# Patient Record
(demographics unavailable — no encounter records)

---

## 2024-12-08 NOTE — HISTORY OF PRESENT ILLNESS
[Neck] : neck [2] : 2 [1] : 2 [Dull/Aching] : dull/aching [Intermittent] : intermittent [Leisure] : leisure [Rest] : rest [Full time] : Work status: full time [de-identified] : 12/06/2024 - Patient presenting in follow up. She reports return of pain/tingling throughout her upper extremity. Stength is reported as intact, but states difficulty opening objects due to pain. D/c Lyrica due to side effects. She is doing pure barre classes which is inclusive of stretching exercises.   09/30/2024 - Patient reports resolution of symptoms. Not symptomatic enough to require medication for pain. She completes home exercises which she finds helpful, specifically mentioning barre classes.   08/09/2024 - Patient presenting to review cervical mri. C/o right proximal arm pain. Takes Meloxicam which is overall helpful. Pain severity worse at nighttime. Attends barre classes for exercise. Severity is controlled compared to prior visit.   07/26/2024 - 50-year-old female presenting for an initial evaluation with complaints of right proximal arm and shoulder pain that started 2 weeks ago without any trauma or injury. The pain level has progressively increased and is now significant. Massages have not been helping. She experiences an intermittent cold sensation in her arm and describes a constant burning sensation. There is no arm weakness. She felt better with an oral steroid, but the pain returned after stopping the medication. 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

## 2024-12-08 NOTE — PHYSICAL EXAM
[Normal Coordination] : normal coordination [Normal DTR UE/LE] : normal DTR UE/LE  [Normal Sensation] : normal sensation [Normal Mood and Affect] : normal mood and affect [Oriented] : oriented [Able to Communicate] : able to communicate [Normal Skin] : normal skin [No Rash] : no rash [No Ulcers] : no ulcers [No Lesions] : no lesions [No obvious lymphadenopathy in areas examined] : no obvious lymphadenopathy in areas examined [Well Developed] : well developed [Peripheral vascular exam is grossly normal] : peripheral vascular exam is grossly normal [No Respiratory Distress] : no respiratory distress [NL (45)] : right lateral flexion 45 degrees [NL (80)] : right lateral rotation 80 degrees [5___] : right grasp 5[unfilled]/5 [Biceps 2+] : biceps 2+ [Triceps 2+] : triceps 2+ [Brachioradialis 2+] : brachioradialis 2+ [] : no rhomboid tenderness

## 2024-12-08 NOTE — HISTORY OF PRESENT ILLNESS
[Neck] : neck [2] : 2 [1] : 2 [Dull/Aching] : dull/aching [Intermittent] : intermittent [Leisure] : leisure [Rest] : rest [Full time] : Work status: full time [de-identified] : 12/06/2024 - Patient presenting in follow up. She reports return of pain/tingling throughout her upper extremity. Stength is reported as intact, but states difficulty opening objects due to pain. D/c Lyrica due to side effects. She is doing pure barre classes which is inclusive of stretching exercises.   09/30/2024 - Patient reports resolution of symptoms. Not symptomatic enough to require medication for pain. She completes home exercises which she finds helpful, specifically mentioning barre classes.   08/09/2024 - Patient presenting to review cervical mri. C/o right proximal arm pain. Takes Meloxicam which is overall helpful. Pain severity worse at nighttime. Attends barre classes for exercise. Severity is controlled compared to prior visit.   07/26/2024 - 50-year-old female presenting for an initial evaluation with complaints of right proximal arm and shoulder pain that started 2 weeks ago without any trauma or injury. The pain level has progressively increased and is now significant. Massages have not been helping. She experiences an intermittent cold sensation in her arm and describes a constant burning sensation. There is no arm weakness. She felt better with an oral steroid, but the pain returned after stopping the medication. 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

## 2024-12-08 NOTE — DISCUSSION/SUMMARY
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 Due to return of radicular symptoms, referred to pain management do discuss trying GRETTA.  Will remain conservative at this time. More urgency for surgery (ACDF C5/6) if there is onset of motor or neurological deficits.  F/U after injection.   Prior to appointment and during encounter with patient extensive medical records were reviewed including but not limited to, hospital records, outpatient records, imaging results, and lab data. During this appointment the patient was examined, diagnoses were discussed and explained in a face to face manner. In addition extensive time was spent reviewing aforementioned diagnostic studies. Counseling including abnormal image results, differential diagnoses, treatment options, risk and benefits, lifestyle changes, current condition, and current medications was performed. Patient's comments, questions, and concerns were addressed and patient verbalized understanding. Based on this patient's presentation at our office, which is an orthopedic spine surgeon's office, this patient inherently / intrinsically has a risk, however minute, of developing issues such as Cauda equina syndrome, bowel and bladder changes, or progression of motor or neurological deficits such as paralysis which may be permanent.  PADMINI SMITH Acting as a Scribe for Dr. Magdi BHANDARI, Padmini Smith, attest that this documentation has been prepared under the direction and in the presence of Provider Diego Fairbanks MD.

## 2024-12-08 NOTE — DISCUSSION/SUMMARY
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 Due to return of radicular symptoms, referred to pain management do discuss trying GRETTA.  Will remain conservative at this time. More urgency for surgery (ACDF C5/6) if there is onset of motor or neurological deficits.  F/U after injection.   Prior to appointment and during encounter with patient extensive medical records were reviewed including but not limited to, hospital records, outpatient records, imaging results, and lab data. During this appointment the patient was examined, diagnoses were discussed and explained in a face to face manner. In addition extensive time was spent reviewing aforementioned diagnostic studies. Counseling including abnormal image results, differential diagnoses, treatment options, risk and benefits, lifestyle changes, current condition, and current medications was performed. Patient's comments, questions, and concerns were addressed and patient verbalized understanding. Based on this patient's presentation at our office, which is an orthopedic spine surgeon's office, this patient inherently / intrinsically has a risk, however minute, of developing issues such as Cauda equina syndrome, bowel and bladder changes, or progression of motor or neurological deficits such as paralysis which may be permanent.  PADMINI SMITH Acting as a Scribe for Dr. Magdi BHANDARI, Padmini Smith, attest that this documentation has been prepared under the direction and in the presence of Provider Diego Fairbanks MD.

## 2024-12-09 NOTE — PHYSICAL EXAM
[de-identified] : Constitutional: - No acute distress - Well developed; well nourished   Neurological: - normal mood and affect - alert and oriented x 3   Cardiovascular: - grossly normal  Cervical Spine Exam:   Inspection: erythema (-) ecchymosis (-) rashes (-)   Palpation:                                               Cervical paraspinal tenderness:         R (+); L (-) Upper trapezius tenderness:              R (+); L (-) Rhomboids tenderness:                      R (-); L (-) Occipital Ridge:                                   R (-); L (-) Supraspinatus tenderness:                 R (-); L (-)   ROM: WNL   Strength Testing:            Right    Left Deltoid                             (5/5)    (5/5) Biceps:                            (5/5)    (5/5) Triceps:                           (5/5)    (5/5) Finger Abductors:           (5/5)    (5/5) Grasp:                             (5/5)    (5/5)   Special Testing: Spurling Test:                  R (+); L (-) Facet load test:               R (-); L (-) Steel test:                  R (-); L (-)   Neuro: SILT throughout right upper extremity SI LT throughout left upper extremity   Reflexes: Biceps   -           R (2+); L (2+) Triceps  -           R (2+); L (2+) Brachioradialis- R (2+); L (2+)   No ankle clonus

## 2024-12-09 NOTE — PHYSICAL EXAM
[de-identified] : Constitutional: - No acute distress - Well developed; well nourished   Neurological: - normal mood and affect - alert and oriented x 3   Cardiovascular: - grossly normal  Cervical Spine Exam:   Inspection: erythema (-) ecchymosis (-) rashes (-)   Palpation:                                               Cervical paraspinal tenderness:         R (+); L (-) Upper trapezius tenderness:              R (+); L (-) Rhomboids tenderness:                      R (-); L (-) Occipital Ridge:                                   R (-); L (-) Supraspinatus tenderness:                 R (-); L (-)   ROM: WNL   Strength Testing:            Right    Left Deltoid                             (5/5)    (5/5) Biceps:                            (5/5)    (5/5) Triceps:                           (5/5)    (5/5) Finger Abductors:           (5/5)    (5/5) Grasp:                             (5/5)    (5/5)   Special Testing: Spurling Test:                  R (+); L (-) Facet load test:               R (-); L (-) Steel test:                  R (-); L (-)   Neuro: SILT throughout right upper extremity SI LT throughout left upper extremity   Reflexes: Biceps   -           R (2+); L (2+) Triceps  -           R (2+); L (2+) Brachioradialis- R (2+); L (2+)   No ankle clonus

## 2024-12-09 NOTE — HISTORY OF PRESENT ILLNESS
[Neck] : neck [8] : 8 [5] : 5 [Tightness] : tightness [Tingling] : tingling [Squeezing] : squeezing [Intermittent] : intermittent [Leisure] : leisure [Work] : work [Sleep] : sleep [Nothing helps with pain getting better] : Nothing helps with pain getting better [Full time] : Work status: full time [FreeTextEntry1] : 12/6/2024 - The patient presents for initial evaluation regarding her neck pain.  Patient was referred by Dr. Fairbanks. Patient reports symptoms radiating to the right upper extremities extending to the hand.  Symptoms started approximately 6 months ago; she denies any particular inciting or exacerbating events.  Symptom distribution described as 50% neck/50% right arm pain.  Had good response to MDP however was short-lived.  Has been undergoing massage therapy as well as chiropractic care without sustained relief.   Subjective Weakness: No Numbness/Tingling: No Bladder/Bowel dysfunction: No Gait Abnormalities: No Fine motor coordination changes: No   Injections: No   Pertinent Surgical History: N/A   Imaging: MRI Cervical Spine (8/4/2024) - [facility]  C2-C3: No disc bulge, spinal canal or foraminal stenosis C3-C4: No disc bulge, spinal canal or foraminal stenosis C4-C5: No disc bulge, spinal canal or foraminal stenosis C5-C6: Central annular fissure with broad-based disc protrusion. There is uncinate spurring and facet hypertrophy. There is severe right and moderate left foraminal stenosis. There is mild spinal canal stenosis. C6-C7: Central annular fissure with broad-based disc protrusion. Mild facet arthrosis. Mild spinal canal and foraminal stenosis. C7-T1: No disc bulge, spinal canal or foraminal stenosis   Physician Disclaimer: I have personally reviewed and confirmed all HPI data with the patient. 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

## 2024-12-09 NOTE — REASON FOR VISIT
[Initial Consultation] : an initial pain management consultation [FreeTextEntry2] : neck/right arm pain

## 2024-12-09 NOTE — ASSESSMENT
[FreeTextEntry1] : We discussed the nature of the underlying pathology and available pain management treatment options. These included interventional, rehabilitative, pharmacological, and complementary modalities. We will proceed with the following:    Interventional treatment options: - Proceed with right PM C7-T1 GRETTA with fluoroscopic guidance - Patient wishes to address lumbar spine issues once adequate relief of cervical - see additional instructions below    Rehabilitative options: - Initiate trial of physical therapy once adequate relief - Participation in active HEP was discussed and encouraged as tolerated   Medication based treatment options: - Continue meloxicam 15 mg daily as needed - See additional instructions below    Complementary treatment options: -Workplace/lifestyle modifications discussed   Additional treatment recommendations as follows: - patient will follow up with Dr. Fairbanks as directed - Follow up 1-2 weeks post injection for assessment of efficacy and further treatment recommendations  The risks, benefits and alternatives of the proposed procedure were explained in detail with the patient. The risks outlined include but are not limited to infection, bleeding, post- dural puncture headache, nerve injury, a temporary increase in pain, failure to resolve symptoms, need for future interventions, allergic reaction, and possible elevation of blood sugar in diabetics if using corticosteroid.  All questions were answered to patient's apparent satisfaction, and he/she verbalized an understanding.  We have discussed the risks, benefits, and alternatives for NSAID therapy including but not limited to the risk of bleeding, thrombosis, gastric mucosal irritation/ulceration, allergic reaction and kidney dysfunction.  The patient was counseled to utilize NSAIDs concurrently with food and/or a PPI if applicable.  They were counseled to use the lowest effective dose for the shortest duration. The patient verbalizes an understanding.  I, Ingrid Dodd, acting as scribe, attest that this documentation has been prepared under the direction and in the presence of Provider Romulo Leon DO.  The documentation recorded by the scribe, in my presence, accurately reflects the service I personally performed, and the decisions made by me with my edits as appropriate.

## 2024-12-09 NOTE — HISTORY OF PRESENT ILLNESS
[Neck] : neck [8] : 8 [5] : 5 [Tightness] : tightness [Tingling] : tingling [Squeezing] : squeezing [Intermittent] : intermittent [Leisure] : leisure [Work] : work [Sleep] : sleep [Nothing helps with pain getting better] : Nothing helps with pain getting better [Full time] : Work status: full time [FreeTextEntry1] : 12/6/2024 - The patient presents for initial evaluation regarding her neck pain.  Patient was referred by Dr. Fairbanks. Patient reports symptoms radiating to the right upper extremities extending to the hand.  Symptoms started approximately 6 months ago; she denies any particular inciting or exacerbating events.  Symptom distribution described as 50% neck/50% right arm pain.  Had good response to MDP however was short-lived.  Has been undergoing massage therapy as well as chiropractic care without sustained relief.   Subjective Weakness: No Numbness/Tingling: No Bladder/Bowel dysfunction: No Gait Abnormalities: No Fine motor coordination changes: No   Injections: No   Pertinent Surgical History: N/A   Imaging: MRI Cervical Spine (8/4/2024) - [facility]  C2-C3: No disc bulge, spinal canal or foraminal stenosis C3-C4: No disc bulge, spinal canal or foraminal stenosis C4-C5: No disc bulge, spinal canal or foraminal stenosis C5-C6: Central annular fissure with broad-based disc protrusion. There is uncinate spurring and facet hypertrophy. There is severe right and moderate left foraminal stenosis. There is mild spinal canal stenosis. C6-C7: Central annular fissure with broad-based disc protrusion. Mild facet arthrosis. Mild spinal canal and foraminal stenosis. C7-T1: No disc bulge, spinal canal or foraminal stenosis   Physician Disclaimer: I have personally reviewed and confirmed all HPI data with the patient. 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

## 2025-02-12 NOTE — PROCEDURE
[FreeTextEntry3] : Date of Service: 02/12/2025   Account: [id number]  Patient: VELIA SNOW   YOB: 1974  Age: 50 years  Surgeon:      Romulo Leon DO  Assistant:    None  Pre-Operative Diagnosis:         Cervical Radiculopathy (M54.12)  Post Operative Diagnosis:       Cervical Radiculopathy (M54.12)  Procedure:             Cervical (C7-T1) interlaminar epidural steroid injection under fluoroscopic guidance  Anesthesia:  MAC  This procedure was carried out using fluoroscopic guidance.  The risks and benefits of the procedure were discussed extensively with the patient.  The consent of the patient was obtained, and the following procedure was performed. The patient was placed in the prone position on the fluoroscopy table and the area was prepped and draped in a sterile fashion.  A timeout was performed with all essential staff present and the site and side were verified.  The patient was placed in the prone position and optimized to patient comfort.  The cervical area was prepped and draped in a sterile fashion.  The fluoroscope visualized the C7-T1 interspace using slight cephalad-caudad angulation and this area was marked.  Using sterile technique, the superficial skin was anesthetized with 1% Lidocaine.  A 20 gauge 3.5-inch Tuohy needle was advanced under fluoroscopy until ligament was engaged.  Using a contralateral oblique view, a "loss of resistance" to air technique was utilized in order to gain access to the epidural space.  After negative aspiration for heme and CSF, 1 cc of Omnipaque contrast was administered and the appropriate cervical epidurogram was obtained in the EMMA and A/P view as well as digital subtraction angiography.  A total injectate of 3 cc of preservative free normal saline and 40 mg of Kenalog was then injected into the epidural space while maintaining meaningful verbal contact with the patient.    Vital signs remained normal throughout the procedure.  The patient tolerated the procedure well.  There were no immediate complications from the performed procedure.  The patient was instructed to apply ice over the injection sites for twenty minutes every two hours for the next 24 hours.  Disposition:      1. The patient was advised to F/U in 1-2 weeks to assess the response to the injection.      2. The patient was also instructed to contact me immediately if there were any concerns related to the procedure performed.

## 2025-03-04 NOTE — REASON FOR VISIT
[Initial Consultation] : an initial pain management consultation [Follow-Up Visit] : a follow-up pain management visit [FreeTextEntry2] : neck/right arm pain

## 2025-03-04 NOTE — HISTORY OF PRESENT ILLNESS
[Neck] : neck [0] : 0 [Occasional] : occasional [Injection therapy] : injection therapy [Full time] : Work status: full time [FreeTextEntry1] : 3/4/2025 - Patient presents for FUV after a C7-T1 GRETTA on 2025. Patient reports 95% reduction in pain overall. Her right arm/hand pain is mostly resolved. She has some residual neck pain but is very rare and much less intense. Patient is pleased with her response.   2024 - The patient presents for initial evaluation regarding her neck pain.  Patient was referred by Dr. Fairbanks. Patient reports symptoms radiating to the right upper extremities extending to the hand.  Symptoms started approximately 6 months ago; she denies any particular inciting or exacerbating events.  Symptom distribution described as 50% neck/50% right arm pain.  Had good response to MDP however was short-lived.  Has been undergoing massage therapy as well as chiropractic care without sustained relief.   Injections:  1) C7-T1 GRETTA (2025)   Pertinent Surgical History: N/A   Imagin) MRI Cervical Spine (2024) - [facility]  C2-C3: No disc bulge, spinal canal or foraminal stenosis C3-C4: No disc bulge, spinal canal or foraminal stenosis C4-C5: No disc bulge, spinal canal or foraminal stenosis C5-C6: Central annular fissure with broad-based disc protrusion. There is uncinate spurring and facet hypertrophy. There is severe right and moderate left foraminal stenosis. There is mild spinal canal stenosis. C6-C7: Central annular fissure with broad-based disc protrusion. Mild facet arthrosis. Mild spinal canal and foraminal stenosis. C7-T1: No disc bulge, spinal canal or foraminal stenosis   Physician Disclaimer: I have personally reviewed and confirmed all HPI data with the patient. [] : Post Surgical Visit: no [FreeTextEntry6] : No pain since injection  [FreeTextEntry8] : No pain since injection  [de-identified] : No pain since injection  [de-identified] : C MRI AT

## 2025-03-04 NOTE — ASSESSMENT
[FreeTextEntry1] : We discussed the nature of the underlying pathology and available pain management treatment options. These included interventional, rehabilitative, pharmacological, and complementary modalities. We will proceed with the following:    Interventional treatment options: - Would proceed with repeat right PM C7-T1 GRETTA (80 mg Kenalog) with return of severe neck/UE radicular pain; will call - Can consider TPI for refractory cervical myofascial pain component  - see additional instructions below    Rehabilitative options: - Recommended formal physical therapy trial for cervical spine; patient defers - Participation in active HEP was discussed and encouraged as tolerated - Home exercise sheet provided   Medication based treatment options: - Continue meloxicam 15 mg daily as needed - Patient has discontinued medication usage following interventional therapy  - See additional instructions below    Complementary treatment options: - Workplace/lifestyle modifications discussed - Continue TENS therapy   Additional treatment recommendations as follows: - Follow-up for indicated procedure or 3 months  The risks, benefits and alternatives of the proposed procedure were explained in detail with the patient. The risks outlined include but are not limited to infection, bleeding, post- dural puncture headache, nerve injury, a temporary increase in pain, failure to resolve symptoms, need for future interventions, allergic reaction, and possible elevation of blood sugar in diabetics if using corticosteroid.  All questions were answered to patient's apparent satisfaction, and he/she verbalized an understanding.  We have discussed the risks, benefits, and alternatives for NSAID therapy including but not limited to the risk of bleeding, thrombosis, gastric mucosal irritation/ulceration, allergic reaction and kidney dysfunction.  The patient was counseled to utilize NSAIDs concurrently with food and/or a PPI if applicable.  They were counseled to use the lowest effective dose for the shortest duration. The patient verbalizes an understanding.  IIngrid, acting as scribe, attest that this documentation has been prepared under the direction and in the presence of Provider Romulo Leon DO.  The documentation recorded by the scribe, in my presence, accurately reflects the service I personally performed, and the decisions made by me with my edits as appropriate.

## 2025-03-04 NOTE — PHYSICAL EXAM
[de-identified] : Constitutional: - No acute distress - Well developed; well nourished   Neurological: - normal mood and affect - alert and oriented x 3   Cardiovascular: - grossly normal  Cervical Spine Exam:   Inspection: erythema (-) ecchymosis (-) rashes (-)   Palpation:                                               Cervical paraspinal tenderness:          R (-); L (-) Upper trapezius tenderness:               R (-); L (-) Rhomboids tenderness:                      R (-); L (-) Occipital Ridge:                                   R (-); L (-) Supraspinatus tenderness:                 R (-); L (-)   ROM: WNL   Strength Testing:            Right    Left Deltoid                             (5/5)    (5/5) Biceps:                            (5/5)    (5/5) Triceps:                           (5/5)    (5/5) Finger Abductors:           (5/5)    (5/5) Grasp:                             (5/5)    (5/5)   Special Testing: Spurling Test:                  R (=); L (-) Facet load test:               R (-); L (-) Steel test:                  R (-); L (-)   Neuro: SILT throughout right upper extremity SI LT throughout left upper extremity   Reflexes: Biceps   -           R (2+); L (2+) Triceps  -           R (2+); L (2+) Brachioradialis- R (2+); L (2+)   No ankle clonus

## 2025-03-04 NOTE — PHYSICAL EXAM
[de-identified] : Constitutional: - No acute distress - Well developed; well nourished   Neurological: - normal mood and affect - alert and oriented x 3   Cardiovascular: - grossly normal  Cervical Spine Exam:   Inspection: erythema (-) ecchymosis (-) rashes (-)   Palpation:                                               Cervical paraspinal tenderness:          R (-); L (-) Upper trapezius tenderness:               R (-); L (-) Rhomboids tenderness:                      R (-); L (-) Occipital Ridge:                                   R (-); L (-) Supraspinatus tenderness:                 R (-); L (-)   ROM: WNL   Strength Testing:            Right    Left Deltoid                             (5/5)    (5/5) Biceps:                            (5/5)    (5/5) Triceps:                           (5/5)    (5/5) Finger Abductors:           (5/5)    (5/5) Grasp:                             (5/5)    (5/5)   Special Testing: Spurling Test:                  R (=); L (-) Facet load test:               R (-); L (-) Steel test:                  R (-); L (-)   Neuro: SILT throughout right upper extremity SI LT throughout left upper extremity   Reflexes: Biceps   -           R (2+); L (2+) Triceps  -           R (2+); L (2+) Brachioradialis- R (2+); L (2+)   No ankle clonus

## 2025-03-04 NOTE — HISTORY OF PRESENT ILLNESS
[Neck] : neck [0] : 0 [Occasional] : occasional [Injection therapy] : injection therapy [Full time] : Work status: full time [FreeTextEntry1] : 3/4/2025 - Patient presents for FUV after a C7-T1 GRETTA on 2025. Patient reports 95% reduction in pain overall. Her right arm/hand pain is mostly resolved. She has some residual neck pain but is very rare and much less intense. Patient is pleased with her response.   2024 - The patient presents for initial evaluation regarding her neck pain.  Patient was referred by Dr. Fairbanks. Patient reports symptoms radiating to the right upper extremities extending to the hand.  Symptoms started approximately 6 months ago; she denies any particular inciting or exacerbating events.  Symptom distribution described as 50% neck/50% right arm pain.  Had good response to MDP however was short-lived.  Has been undergoing massage therapy as well as chiropractic care without sustained relief.   Injections:  1) C7-T1 GRETTA (2025)   Pertinent Surgical History: N/A   Imagin) MRI Cervical Spine (2024) - [facility]  C2-C3: No disc bulge, spinal canal or foraminal stenosis C3-C4: No disc bulge, spinal canal or foraminal stenosis C4-C5: No disc bulge, spinal canal or foraminal stenosis C5-C6: Central annular fissure with broad-based disc protrusion. There is uncinate spurring and facet hypertrophy. There is severe right and moderate left foraminal stenosis. There is mild spinal canal stenosis. C6-C7: Central annular fissure with broad-based disc protrusion. Mild facet arthrosis. Mild spinal canal and foraminal stenosis. C7-T1: No disc bulge, spinal canal or foraminal stenosis   Physician Disclaimer: I have personally reviewed and confirmed all HPI data with the patient. [] : Post Surgical Visit: no [FreeTextEntry6] : No pain since injection  [FreeTextEntry8] : No pain since injection  [de-identified] : No pain since injection  [de-identified] : C MRI AT

## 2025-07-01 NOTE — ASSESSMENT
[FreeTextEntry1] : We discussed the nature of the underlying pathology and available pain management treatment options. These included interventional, rehabilitative, pharmacological, and complementary modalities. We will proceed with the following:    Interventional treatment options: - Proceed with repeat right PM C7-T1 GRETTA (80 mg Kenalog) with fluoroscopic guidance - Can consider TPI for refractory cervical myofascial pain component  - see additional instructions below    Rehabilitative options: - Previously recommended formal physical therapy trial for cervical spine; patient defers - Participation in active HEP was discussed and encouraged as tolerated - Home exercise sheet provided at prior visit   Medication based treatment options: - Caution with consistent use of NSAIDs secondary to prior bariatric surgery - Initiate MDP; hold NSAIDs while on MDP - Initiate trial of Lyrica 75 mg BID; titration schedule provided - See additional instructions below    Complementary treatment options: - Workplace/lifestyle modifications discussed - Continue TENS therapy   Additional treatment recommendations as follows: - Patient was counseled as to red flag signs and when to seek urgent care - Follow up 1-2 weeks post injection for assessment of efficacy and further treatment recommendations  The risks, benefits and alternatives of the proposed procedure were explained in detail with the patient. The risks outlined include but are not limited to infection, bleeding, post- dural puncture headache, nerve injury, a temporary increase in pain, failure to resolve symptoms, need for future interventions, allergic reaction, and possible elevation of blood sugar in diabetics if using corticosteroid.  All questions were answered to patient's apparent satisfaction, and he/she verbalized an understanding.  We have discussed the risks, benefits, and alternatives for NSAID therapy including but not limited to the risk of bleeding, thrombosis, gastric mucosal irritation/ulceration, allergic reaction and kidney dysfunction.  The patient was counseled to utilize NSAIDs concurrently with food and/or a PPI if applicable.  They were counseled to use the lowest effective dose for the shortest duration. The patient verbalizes an understanding.

## 2025-07-01 NOTE — PHYSICAL EXAM
[de-identified] : Constitutional: - No acute distress - Well developed; well nourished   Neurological: - normal mood and affect - alert and oriented x 3   Cardiovascular: - grossly normal  Cervical Spine Exam:   Inspection: erythema (-) ecchymosis (-) rashes (-)   Palpation:                                               Cervical paraspinal tenderness:          R (+); L (-) Upper trapezius tenderness:               R (+); L (-) Rhomboids tenderness:                      R (-); L (-) Occipital Ridge:                                   R (-); L (-) Supraspinatus tenderness:                 R (-); L (-)   ROM: WNL   Strength Testing:            Right    Left Deltoid                             (5/5)    (5/5) Biceps:                            (5/5)    (5/5) Triceps:                           (5/5)    (5/5) Finger Abductors:           (5/5)    (5/5) Grasp:                             (5/5)    (5/5)   Special Testing: Spurling Test:                  R (+); L (-) Facet load test:               R (-); L (-) Steel test:                  R (-); L (-)   Neuro: SILT throughout right upper extremity SI LT throughout left upper extremity   Reflexes: Biceps   -           R (2+); L (2+) Triceps  -           R (2+); L (2+) Brachioradialis- R (2+); L (2+)   No ankle clonus

## 2025-07-01 NOTE — DATA REVIEWED
[MRI] : MRI [Cervical Spine] : cervical spine [Report was reviewed and noted in the chart] : The report was reviewed and noted in the chart [I reviewed the films/CD] : I reviewed the films/CD [I independently reviewed and interpreted images and report] : I independently reviewed and interpreted images and report

## 2025-07-01 NOTE — HISTORY OF PRESENT ILLNESS
[Neck] : neck [3] : 3 [5] : 5 [Burning] : burning [Dull/Aching] : dull/aching [Radiating] : radiating [Constant] : constant [Household chores] : household chores [Leisure] : leisure [Sleep] : sleep [Meds] : meds [FreeTextEntry1] : 2025 - Patient presents today for urgent FUV regarding their neck pain.  She reports a return of neck pain with radiation to the right upper extremity which began about 4 5 days ago.  Pain became so severe she presented to [facility] ER yesterday.  Treated with dexamethasone and Valium with some benefit.  She denies any new onset upper extremity weakness, bladder/bowel dysfunction, or saddle numbness.  3/4/2025 - Patient presents for FUV after a C7-T1 GRETTA on 2025. Patient reports 95% reduction in pain overall. Her right arm/hand pain is mostly resolved. She has some residual neck pain but is very rare and much less intense. Patient is pleased with her response.   2024 - The patient presents for initial evaluation regarding her neck pain.  Patient was referred by Dr. Fairbanks. Patient reports symptoms radiating to the right upper extremities extending to the hand.  Symptoms started approximately 6 months ago; she denies any particular inciting or exacerbating events.  Symptom distribution described as 50% neck/50% right arm pain.  Had good response to MDP however was short-lived.  Has been undergoing massage therapy as well as chiropractic care without sustained relief.   Injections:  1) C7-T1 GRETTA (2025)   Pertinent Surgical History: N/A   Imagin) MRI Cervical Spine (2024) - [facility]  C2-C3: No disc bulge, spinal canal or foraminal stenosis C3-C4: No disc bulge, spinal canal or foraminal stenosis C4-C5: No disc bulge, spinal canal or foraminal stenosis C5-C6: Central annular fissure with broad-based disc protrusion. There is uncinate spurring and facet hypertrophy. There is severe right and moderate left foraminal stenosis. There is mild spinal canal stenosis. C6-C7: Central annular fissure with broad-based disc protrusion. Mild facet arthrosis. Mild spinal canal and foraminal stenosis. C7-T1: No disc bulge, spinal canal or foraminal stenosis   Physician Disclaimer: I have personally reviewed and confirmed all HPI data with the patient. [] : Post Surgical Visit: no [FreeTextEntry7] : rt shoulder, arm  [de-identified] : The pain comes in and then it gets worse  [de-identified] : MRI Cervical Spine (8/4/2024) -  [facility]

## 2025-07-09 NOTE — PROCEDURE
[FreeTextEntry3] : Date of Service: 07/09/2025   Account: [id number]  Patient: VELIA SNOW   YOB: 1974  Age: 51 years  Surgeon:      Romulo Leon DO  Assistant:    None  Pre-Operative Diagnosis:         Cervical Radiculopathy (M54.12)  Post Operative Diagnosis:       Cervical Radiculopathy (M54.12)  Procedure:             Cervical (C7-T1) interlaminar epidural steroid injection under fluoroscopic guidance  Anesthesia:  MAC  This procedure was carried out using fluoroscopic guidance.  The risks and benefits of the procedure were discussed extensively with the patient.  The consent of the patient was obtained and the following procedure was performed.  A timeout was performed with all essential staff present and the site and side were verified.  The patient was placed in the prone position and optimized to patient comfort.  The cervical area was prepped and draped in a sterile fashion.  The fluoroscope visualized the C7-T1 interspace using slight cephalad-caudad angulation and this area was marked.  Using sterile technique, the superficial skin was anesthetized with 1% Lidocaine.  A 20-gauge 3.5-inch Tuohy needle was advanced under fluoroscopy until ligament was engaged.  Using a contralateral oblique view, a "loss of resistance" to air technique was utilized in order to gain access to the epidural space.  After negative aspiration for heme and CSF, 1 cc of Omnipaque contrast was administered and the appropriate cervical epidurogram was obtained in the EMMA and A/P view as well as digital subtraction angiography.  A total injectate of 3 cc of preservative free normal saline and 80 mg of Kenalog was then injected into the epidural space while maintaining meaningful verbal contact with the patient.    Vital signs remained normal throughout the procedure.  The patient tolerated the procedure well.  There were no immediate complications from the performed procedure.  The patient was instructed to apply ice over the injection sites for twenty minutes every two hours for the next 24 hours.  Disposition:      1. The patient was advised to F/U in 1-2 weeks to assess the response to the injection.      2. The patient was also instructed to contact me immediately if there were any concerns related to the procedure performed.

## 2025-07-28 NOTE — PHYSICAL EXAM
[de-identified] : Constitutional: - No acute distress - Well developed; well nourished   Neurological: - normal mood and affect - alert and oriented x 3   Cardiovascular: - grossly normal  Cervical Spine Exam:   Inspection: erythema (-) ecchymosis (-) rashes (-)   Palpation:                                               Cervical paraspinal tenderness:          R (+); L (-) Upper trapezius tenderness:               R (+); L (-) Rhomboids tenderness:                      R (-); L (-) Occipital Ridge:                                   R (-); L (-) Supraspinatus tenderness:                 R (-); L (-)   ROM: WNL   Strength Testing:            Right    Left Deltoid                             (5/5)    (5/5) Biceps:                            (4/5)    (5/5) Triceps:                           (4/5)    (5/5) Finger Abductors:           (5/5)    (5/5) Grasp:                             (5/5)    (5/5)   Special Testing: Spurling Test:                  R (+); L (-) Facet load test:               R (-); L (-) Steel test:                  R (-); L (-)   Neuro: SILT throughout right upper extremity SI LT throughout left upper extremity   Reflexes: Biceps   -           R (2+); L (2+) Triceps  -           R (2+); L (2+) Brachioradialis- R (2+); L (2+)   No ankle clonus

## 2025-07-28 NOTE — ASSESSMENT
[FreeTextEntry1] : We discussed the nature of the underlying pathology and available pain management treatment options. These included interventional, rehabilitative, pharmacological, and complementary modalities. We will proceed with the following:    Interventional treatment options: - None indicated present time - Can consider repeat right PM C7-T1 GRETTA (80 mg Kenalog) pending review of MRI imaging - Can consider TPI for refractory cervical myofascial pain component  - see additional instructions below    Rehabilitative options: - Initiate trial of physical therapy; prescription provided today - Participation in active HEP was discussed and encouraged as tolerated - Home exercise sheet provided at prior visit   Medication based treatment options: - Caution with consistent use of NSAIDs secondary to prior bariatric surgery - Continue Tylenol 500-1000 mg up to TID as needed - Restart cyclobenzaprine 5-10 mg up to BID as needed for spasm - Patient self-discontinued Lyrica due to perceived side effects (cognitive delay) - See additional instructions below    Complementary treatment options: - Workplace/lifestyle modifications discussed - Continue TENS therapy   Additional treatment recommendations as follows: - Patient was counseled as to red flag signs and when to seek urgent care - Obtain MRI cervical spine; evaluate for progression - Follow-up for MRI imaging review  We have discussed the risks, benefits, and alternatives for NSAID therapy including but not limited to the risk of bleeding, thrombosis, gastric mucosal irritation/ulceration, allergic reaction and kidney dysfunction.  The patient was counseled to utilize NSAIDs concurrently with food and/or a PPI if applicable.  They were counseled to use the lowest effective dose for the shortest duration. The patient verbalizes an understanding.

## 2025-07-28 NOTE — PHYSICAL EXAM
[de-identified] : Constitutional: - No acute distress - Well developed; well nourished   Neurological: - normal mood and affect - alert and oriented x 3   Cardiovascular: - grossly normal  Cervical Spine Exam:   Inspection: erythema (-) ecchymosis (-) rashes (-)   Palpation:                                               Cervical paraspinal tenderness:          R (+); L (-) Upper trapezius tenderness:               R (+); L (-) Rhomboids tenderness:                      R (-); L (-) Occipital Ridge:                                   R (-); L (-) Supraspinatus tenderness:                 R (-); L (-)   ROM: WNL   Strength Testing:            Right    Left Deltoid                             (5/5)    (5/5) Biceps:                            (4/5)    (5/5) Triceps:                           (4/5)    (5/5) Finger Abductors:           (5/5)    (5/5) Grasp:                             (5/5)    (5/5)   Special Testing: Spurling Test:                  R (+); L (-) Facet load test:               R (-); L (-) Steel test:                  R (-); L (-)   Neuro: SILT throughout right upper extremity SI LT throughout left upper extremity   Reflexes: Biceps   -           R (2+); L (2+) Triceps  -           R (2+); L (2+) Brachioradialis- R (2+); L (2+)   No ankle clonus

## 2025-07-28 NOTE — HISTORY OF PRESENT ILLNESS
[Neck] : neck [7] : 7 [Dull/Aching] : dull/aching [Radiating] : radiating [Sharp] : sharp [Throbbing] : throbbing [Constant] : constant [Household chores] : household chores [Leisure] : leisure [Sleep] : sleep [Sitting] : sitting [FreeTextEntry1] : 2025 - Patient presents for follow-up visit after repeat C7-T1 GRETTA on 2025.  She reports minimal to no improvement of her symptoms following.  Still continues to report pain in the neck with radiation to the right upper extremity.  Focus of greatest pain is in the right upper arm although there is symptoms radiating to the right hand.  There is some subjective weakness.  Using Tylenol as needed with some benefit.  Has discontinued Lyrica due to perceived side effects (cognitive delay).  2025 - Patient presents today for urgent FUV regarding their neck pain.  She reports a return of neck pain with radiation to the right upper extremity which began about 4 5 days ago.  Pain became so severe she presented to [facility] ER yesterday.  Treated with dexamethasone and Valium with some benefit.  She denies any new onset upper extremity weakness, bladder/bowel dysfunction, or saddle numbness.  3/4/2025 - Patient presents for FUV after a C7-T1 GRETTA on 2025. Patient reports 95% reduction in pain overall. Her right arm/hand pain is mostly resolved. She has some residual neck pain but is very rare and much less intense. Patient is pleased with her response.   2024 - The patient presents for initial evaluation regarding her neck pain.  Patient was referred by Dr. Fairbanks. Patient reports symptoms radiating to the right upper extremities extending to the hand.  Symptoms started approximately 6 months ago; she denies any particular inciting or exacerbating events.  Symptom distribution described as 50% neck/50% right arm pain.  Had good response to MDP however was short-lived.  Has been undergoing massage therapy as well as chiropractic care without sustained relief.   Injections:  1) C7-T1 GRETTA (2025, 2025)   Pertinent Surgical History: N/A   Imagin) MRI Cervical Spine (2024) - [facility]  C2-C3: No disc bulge, spinal canal or foraminal stenosis C3-C4: No disc bulge, spinal canal or foraminal stenosis C4-C5: No disc bulge, spinal canal or foraminal stenosis C5-C6: Central annular fissure with broad-based disc protrusion. There is uncinate spurring and facet hypertrophy. There is severe right and moderate left foraminal stenosis. There is mild spinal canal stenosis. C6-C7: Central annular fissure with broad-based disc protrusion. Mild facet arthrosis. Mild spinal canal and foraminal stenosis. C7-T1: No disc bulge, spinal canal or foraminal stenosis   Physician Disclaimer: I have personally reviewed and confirmed all HPI data with the patient. [] : Post Surgical Visit: no [FreeTextEntry7] : upper back to the rt arm, rt wrist  [FreeTextEntry9] : lying in bed  [de-identified] : MRI Cervical Spine (8/4/2024) -  [facility]

## 2025-07-28 NOTE — HISTORY OF PRESENT ILLNESS
[Neck] : neck [7] : 7 [Dull/Aching] : dull/aching [Radiating] : radiating [Sharp] : sharp [Throbbing] : throbbing [Constant] : constant [Household chores] : household chores [Leisure] : leisure [Sleep] : sleep [Sitting] : sitting [FreeTextEntry1] : 2025 - Patient presents for follow-up visit after repeat C7-T1 GRETTA on 2025.  She reports minimal to no improvement of her symptoms following.  Still continues to report pain in the neck with radiation to the right upper extremity.  Focus of greatest pain is in the right upper arm although there is symptoms radiating to the right hand.  There is some subjective weakness.  Using Tylenol as needed with some benefit.  Has discontinued Lyrica due to perceived side effects (cognitive delay).  2025 - Patient presents today for urgent FUV regarding their neck pain.  She reports a return of neck pain with radiation to the right upper extremity which began about 4 5 days ago.  Pain became so severe she presented to [facility] ER yesterday.  Treated with dexamethasone and Valium with some benefit.  She denies any new onset upper extremity weakness, bladder/bowel dysfunction, or saddle numbness.  3/4/2025 - Patient presents for FUV after a C7-T1 GRETTA on 2025. Patient reports 95% reduction in pain overall. Her right arm/hand pain is mostly resolved. She has some residual neck pain but is very rare and much less intense. Patient is pleased with her response.   2024 - The patient presents for initial evaluation regarding her neck pain.  Patient was referred by Dr. Fairbanks. Patient reports symptoms radiating to the right upper extremities extending to the hand.  Symptoms started approximately 6 months ago; she denies any particular inciting or exacerbating events.  Symptom distribution described as 50% neck/50% right arm pain.  Had good response to MDP however was short-lived.  Has been undergoing massage therapy as well as chiropractic care without sustained relief.   Injections:  1) C7-T1 GRETTA (2025, 2025)   Pertinent Surgical History: N/A   Imagin) MRI Cervical Spine (2024) - [facility]  C2-C3: No disc bulge, spinal canal or foraminal stenosis C3-C4: No disc bulge, spinal canal or foraminal stenosis C4-C5: No disc bulge, spinal canal or foraminal stenosis C5-C6: Central annular fissure with broad-based disc protrusion. There is uncinate spurring and facet hypertrophy. There is severe right and moderate left foraminal stenosis. There is mild spinal canal stenosis. C6-C7: Central annular fissure with broad-based disc protrusion. Mild facet arthrosis. Mild spinal canal and foraminal stenosis. C7-T1: No disc bulge, spinal canal or foraminal stenosis   Physician Disclaimer: I have personally reviewed and confirmed all HPI data with the patient. [] : Post Surgical Visit: no [FreeTextEntry7] : upper back to the rt arm, rt wrist  [FreeTextEntry9] : lying in bed  [de-identified] : MRI Cervical Spine (8/4/2024) -  [facility]